# Patient Record
(demographics unavailable — no encounter records)

---

## 2025-05-08 NOTE — ASSESSMENT
[FreeTextEntry1] :  69-year-old *** man with PMH of ED, BPH, hx of microhematuria (first noiced in 3/2021) who presents for biopsy evaluation. Patient reports urinary frequency, occ urgency, nocturia x 2-3, moderate urinary stream, occ trouble emptying his bladder.  Denies family hx of  cancers.   RDR with Dr. Pritchett on 5/19/25 TP prostate biopsy scheduled for 6/26/25  He understands that many men with prostate cancer will die with the disease rather than of it and we also discussed the results large multi-center American and  prostate cancer screening trials. He also understands that PSA in and of itself does not diagnose prostate cancer but only assesses risk to a certain degree. The patient understands that to definitively screen for prostate cancer, a biopsy is required and this procedure has risks, including bleeding, infection, ED and urinary retention. The patient opted to move forward with the biopsy.   The patient is aware to expect hematuria x 2 weeks and upto 4 weeks of hematospermia.  There is a risk of infection albeit much lower than a transrectal approach. Any fever/chills after the biopsy the patient is to contact the office and go to the ER for an immediate evaluation. He has been given paper instructions outlining these items - which includes medications to avoid prior to surgery.   1. CBC, BMP, PSA, UA UCx 2. Medical/Cardiac Clearance 3. TP biopsy at [facility] 4. follow up 2 weeks after biopsy with Dr. Cary 5. we will call with the path results once they are resulted.    Thank you very much for allowing me to assist in the care of this patient. Please do not hesitate to contact me with any additional questions or concerns.

## 2025-05-08 NOTE — HISTORY OF PRESENT ILLNESS
[FreeTextEntry1] :  Dear Dr. Cary,   Thank you so much for the referral to help care for your patient.  Chief Complaint:   elevated psa  Date of first visit: 4/10/25  Osorio Glaser is a 69-year-old *** man with PMH of ED, BPH, hx of microhematuria (first noiced in 3/2021) who presents for biopsy evaluation. Patient reports urinary frequency, occ urgency, nocturia x 2-3, moderate urinary stream, occ trouble emptying his bladder.  Denies family hx of  cancers.   Biopsy by Dr. Cary 4/23/24  benign prostatic glands, basal cell hyperplasia, benign stromal tissues  MRI at [facility] on 3/28/25 28cc prostate, PIRADS 4 lesion in left apex PZ anterior, measuring 1.2 x 0.7 x 0.9cm, PIRADS 3 lesion in right midgland TZ anterior, measuring 0.9 x 0.8 x 0.3cm, No EPE. No LAD and no bony lesions. The images have been reviewed and clinical implications discussed with the patient.  MRI at [facility] on 4/9/24 36 cc prostate, PIRADS 4 lesion in left anterior (TZa) midgland, TZ, measuring 9 x 11 x 9mm, abuts capsule, PIRADS 3 lesion in right, anterior (TZa), base to midgland, TZ, measuring 9 x 8 x 6mm, abuts capsule, No EPE. No LAD and no bony lesions. The images have been reviewed and clinical implications discussed with the patient.  PMHx:    SxHx: none  FHx:    SocHx:  never smoker   Allergies:    PSA Hx:   date? *** 7.55 ng/mL  7/30/24 5.02 ng/mL   4/10/25 IPSS QOL NEIL   The patient denies fevers, chills, nausea and or vomiting and no unexplained weight loss.  All pertinent laboratory, films and physician notes were reviewed. Questionnaire results were discussed with patient.

## 2025-05-28 NOTE — ADDENDUM
[FreeTextEntry1] : 69-year-old man with PMH of HTN, HLD, ED, hx of alejandra hematuria in 3/2021, hx of recurrents UTIs, who presents for elevated psa  Patient reports urinary frequency, occ urgency. Nocturia x 2-3. He had moderate stream. No hx of renal stones.    Thank you very much for allowing me to assist in the care of this patient. Please do not hesitate to contact me with any additional questions or concerns.

## 2025-05-28 NOTE — HISTORY OF PRESENT ILLNESS
[FreeTextEntry1] : Dear Dr. Cary   Thank you so much for the referral to help care for your patient.  Chief Complaint: MRI Review   Date of first visit: 05/19/2025  JOSEFINA RIVERA  is a 69 year old man who is a RDR patient that presents for an MRI review.   PSA Hx 5.01 07/30/2024   MRI Hx MRI at [facility] on 03/28/2025.  28 cc prostate with PIRADS 4 lesion measuring 1.2 x 0.7 x 0.9 cm in the left apex peripheral zone anterior. Lesion #2 PIRADS 3 measuring 0.9 x 0.8 x 0.3 cm in the right mid gland transition zone anterior. No LAD No EPE, No Bony Lesions.  The images have been reviewed and clinical implications discussed with the patient.  MRI at [facility] on 04/04/2024. 36 cc prostate with PIRADS 4 lesion #1 measuring 9 x 11 x 9 mm in the left, anterior (TZa), mid gland, transition zone. Lesion #2 measuring 9 x 8 x 6 mm in the right, anterior (TZa), base-to-mid gland, transition zone. No LAD No EPE, No Bony Lesions.  The images have been reviewed and clinical implications discussed with the patient.  Biopsy Hx 04/23/2024 with Dr. Cary - Benign    05/19/2025 IPSS  QOL  NEIL  EPIC 26    The patient denies fevers, chills, nausea and or vomiting and no unexplained weight loss.  All pertinent laboratory, films and physician notes were reviewed.  Questionnaire results were discussed with patient.

## 2025-05-28 NOTE — HISTORY OF PRESENT ILLNESS
[FreeTextEntry1] : Dear Dr. Cary   Thank you so much for the referral to help care for your patient.  Chief Complaint: MRI Review   Date of first visit: 05/19/2025  JOSEFINA RIVERA  is a 69 year old man who is a RDR patient that presents for an MRI review.   PSA Hx 5.01 07/30/2024   MRI Hx MRI at [facility] on 03/28/2025.  28 cc prostate with PIRADS 4 lesion measuring 1.2 x 0.7 x 0.9 cm in the left apex peripheral zone anterior. Lesion #2 PIRADS 3 measuring 0.9 x 0.8 x 0.3 cm in the right mid gland transition zone anterior. No LAD No EPE, No Bony Lesions.  The images have been reviewed and clinical implications discussed with the patient.  MRI at [facility] on 04/04/2024. 36 cc prostate with PIRADS 4 lesion #1 measuring 9 x 11 x 9 mm in the left, anterior (TZa), mid gland, transition zone. Lesion #2 measuring 9 x 8 x 6 mm in the right, anterior (TZa), base-to-mid gland, transition zone. No LAD No EPE, No Bony Lesions.  The images have been reviewed and clinical implications discussed with the patient.  Biopsy Hx 04/23/2024 with Dr. Cary - Benign    05/19/2025 IPSS  QOL  NEIL  EPIC 26    The patient denies fevers, chills, nausea and or vomiting and no unexplained weight loss.  All pertinent laboratory, films and physician notes were reviewed.  Questionnaire results were discussed with patient.

## 2025-05-28 NOTE — ASSESSMENT
[FreeTextEntry1] : 69-year-old man with PMH of HTN, HLD, afib and is eliquis, right ankle blood clot, ED, hx of alejandra hematuria in 3/2021, hx of recurrents UTIs, who presents for elevated psa. Denies any bothersome urinary symptoms. He has moderate stream. No hx of renal stones.  Schedule RDR with Dr. Pritchett, as well as TP prostate biopsy   IRB Notification  The patient has been made aware of the opportunity to participate in our MR US fusion guided biopsy trial testing the next generation biopsy technology. This is an IRB approved trial (IRB #). He is already being consented for a standard MR US fusion guided biopsy therefore there is no change in his clinical work flow. He has been given a copy of the consent to review before the biopsy date and given an opportunity to contact us with any further questions. He will be consented on the day of the procedure or electronically before the biopsy.  He understands that many men with prostate cancer will die with the disease rather than of it and we also discussed the results large multi-center American and  prostate cancer screening trials. He also understands that PSA in and of itself does not diagnose prostate cancer but only assesses risk to a certain degree. The patient understands that to definitively screen for prostate cancer, a biopsy is required, and this procedure has risks, including bleeding, infection, ED and urinary retention. The patient opted to move forward with the biopsy.  The patient is aware to expect hematuria x 2 weeks and up to 4 weeks of hematospermia. There is a risk of infection albeit much lower than a transrectal approach. Any fever/chills after the biopsy the patient is to contact the office and go to the ER for an immediate evaluation. He has been given paper instructions outlining these items - which includes medications to avoid prior to surgery.  1. CBC, BMP, UA/UCx, PSA today 5/28/25 2. Cardiac clearance, EKG and CXR  3. TP biopsy at [facility] 4. follow up 2 weeks after biopsy with Dr. Cary  5. we will call with the path results once they are resulted.  Thank you very much for allowing me to assist in the care of this patient. Please do not hesitate to contact me with any additional questions or concerns.

## 2025-05-28 NOTE — ASSESSMENT
[FreeTextEntry1] : 69-year-old man with PMH of HTN, HLD, afib and is eliquis, right ankle blood clot, ED, hx of alejandra hematuria in 3/2021, hx of recurrents UTIs, who presents for elevated psa. Denies any bothersome urinary symptoms. He has moderate stream. No hx of renal stones.  Schedule RDR with Dr. Pritchett, as well as TP prostate biopsy   IRB Notification  The patient has been made aware of the opportunity to participate in our MR US fusion guided biopsy trial testing the next generation biopsy technology. This is an IRB approved trial (IRB #). He is already being consented for a standard MR US fusion guided biopsy therefore there is no change in his clinical work flow. He has been given a copy of the consent to review before the biopsy date and given an opportunity to contact us with any further questions. He will be consented on the day of the procedure or electronically before the biopsy.  He understands that many men with prostate cancer will die with the disease rather than of it and we also discussed the results large multi-center American and  prostate cancer screening trials. He also understands that PSA in and of itself does not diagnose prostate cancer but only assesses risk to a certain degree. The patient understands that to definitively screen for prostate cancer, a biopsy is required, and this procedure has risks, including bleeding, infection, ED and urinary retention. The patient opted to move forward with the biopsy.  The patient is aware to expect hematuria x 2 weeks and up to 4 weeks of hematospermia. There is a risk of infection albeit much lower than a transrectal approach. Any fever/chills after the biopsy the patient is to contact the office and go to the ER for an immediate evaluation. He has been given paper instructions outlining these items - which includes medications to avoid prior to surgery.  1. CBC, BMP, UA/UCx, PSA today 5/28/25 2. Cardiac clearance, EKG and CXR  3. TP biopsy at [facility] 4. follow up 2 weeks after biopsy with Dr. Cary  5. we will call with the path results once they are resulted.  Thank you very much for allowing me to assist in the care of this patient. Please do not hesitate to contact me with any additional questions or concerns.

## 2025-05-28 NOTE — PHYSICAL EXAM
[General Appearance - Well Nourished] : well nourished [General Appearance - In No Acute Distress] : no acute distress [] : no respiratory distress [Normal Station and Gait] : the gait and station were normal for the patient's age [Oriented To Time, Place, And Person] : oriented to person, place, and time

## 2025-05-28 NOTE — HISTORY OF PRESENT ILLNESS
[FreeTextEntry1] : Dear Dr. Cary,  Thank you so much for the referral to help care for your patient.  Chief Complaint: MRI Review Date of first visit: 05/19/2025  JOSEFINA RIVERA is a 69-year-old man with PMH of HTN, HLD, ED, colitis, afib and is on Eliquis, right ankle blood clot (being followed by cardiologist), hx of alejandra hematuria in 3/2021, hx of recurrent UTIs, who presents for elevated psa . Denies any bothersome urinary symptoms. He has moderate stream. No hx of renal stones.   Unknown Family hx: adopted   PSA Hx 7.08 3/10/25, PSAD 0.25 ng/ml/cc  6.10 10/30/2024 5.01 07/30/2024, PSAD 0.17 ng/ml/cc  4.0 12/14/23  3.8 8/31/23  3.9 6/15/23 2.8 1/2022 3.0 7/13/21 2.9 3/2021   MRI Hx MRI at [facility] on 03/28/2025. 28 cc prostate with PIRADS 4 lesion measuring 1.2 x 0.7 x 0.9 cm in the left apex peripheral zone anterior. Lesion #2 PIRADS 3 measuring 0.9 x 0.8 x 0.3 cm in the right mid gland transition zone anterior. No LAD No EPE, No Bony Lesions. The images have been reviewed and clinical implications discussed with the patient.  MRI at [facility] on 04/04/2024. 36 cc prostate with PIRADS 4 lesion #1 measuring 9 x 11 x 9 mm in the left, anterior (TZa), mid gland, transition zone. Lesion #2 measuring 9 x 8 x 6 mm in the right, anterior (TZa), base-to-mid gland, transition zone. No LAD No EPE, No Bony Lesions. The images have been reviewed and clinical implications discussed with the patient.  Biopsy Hx 04/23/2024 with Dr. Cary - Benign  Social Hx:  never smoker  alcohol socially, used to drink 2 shots with meals uses marijuana   05/19/2025 IPSS 1 QOL 2-3 NEIL- not sexually active    Prostate cancer screening: the patient and I spoke at length about prostate cancer screening, its risks and its benefits. The patient has 2 (age, PSA) risk factors for prostate cancer. He understands that many men with prostate cancer will die with the disease rather than of it and we also discussed the results large multi-center American and  prostate cancer screening trials. He also understands that PSA in and of itself does not diagnose prostate cancer but only assesses risk to a certain degree. The patient understands that to definitively screen for prostate cancer, a biopsy is required, and this procedure has risks, including bleeding, infection, ED and urinary retention. The patient opted to fusion biopsy.  The patient denies fevers, chills, nausea and or vomiting and no unexplained weight loss.  All pertinent laboratory, films and physician notes were reviewed. Questionnaire results were discussed with patient.

## 2025-05-28 NOTE — HISTORY OF PRESENT ILLNESS
[FreeTextEntry1] : Dear Dr. Cary,  Thank you so much for the referral to help care for your patient.  Chief Complaint: MRI Review Date of first visit: 05/19/2025  JOSEFINA RIVERA is a 69-year-old man with PMH of HTN, HLD, ED, colitis, afib and is on Eliquis, right ankle blood clot (being followed by cardiologist), hx of alejandra hematuria in 3/2021, hx of recurrent UTIs, who presents for elevated psa . Denies any bothersome urinary symptoms. He has moderate stream. No hx of renal stones.   Unknown Family hx: adopted   PSA Hx 7.08 3/10/25, PSAD 0.25 ng/ml/cc  6.10 10/30/2024 5.01 07/30/2024, PSAD 0.17 ng/ml/cc  4.0 12/14/23  3.8 8/31/23  3.9 6/15/23 2.8 1/2022 3.0 7/13/21 2.9 3/2021   MRI Hx MRI at [facility] on 03/28/2025. 28 cc prostate with PIRADS 4 lesion measuring 1.2 x 0.7 x 0.9 cm in the left apex peripheral zone anterior. Lesion #2 PIRADS 3 measuring 0.9 x 0.8 x 0.3 cm in the right mid gland transition zone anterior. No LAD No EPE, No Bony Lesions. The images have been reviewed and clinical implications discussed with the patient.  MRI at [facility] on 04/04/2024. 36 cc prostate with PIRADS 4 lesion #1 measuring 9 x 11 x 9 mm in the left, anterior (TZa), mid gland, transition zone. Lesion #2 measuring 9 x 8 x 6 mm in the right, anterior (TZa), base-to-mid gland, transition zone. No LAD No EPE, No Bony Lesions. The images have been reviewed and clinical implications discussed with the patient.  Biopsy Hx 04/23/2024 with Dr. Cary - Benign  Social Hx:  never smoker  alcohol socially, used to drink 2 shots with meals uses marijuana   05/19/2025 IPSS 1 QOL 2-3 NEIL- not sexually active    Prostate cancer screening: the patient and I spoke at length about prostate cancer screening, its risks and its benefits. The patient has 2 (age, PSA) risk factors for prostate cancer. He understands that many men with prostate cancer will die with the disease rather than of it and we also discussed the results large multi-center American and  prostate cancer screening trials. He also understands that PSA in and of itself does not diagnose prostate cancer but only assesses risk to a certain degree. The patient understands that to definitively screen for prostate cancer, a biopsy is required, and this procedure has risks, including bleeding, infection, ED and urinary retention. The patient opted to fusion biopsy.  The patient denies fevers, chills, nausea and or vomiting and no unexplained weight loss.  All pertinent laboratory, films and physician notes were reviewed. Questionnaire results were discussed with patient.

## 2025-06-09 NOTE — HISTORY OF PRESENT ILLNESS
[FreeTextEntry1] : Dear Dr. Cary,  Thank you so much for the referral to help care for your patient.  Chief Complaint: MRI Review Date of first visit: 05/19/2025  JOSEFINA RIVERA is a 69-year-old man with PMH of HTN, HLD, ED, colitis, afib and is on Eliquis, right ankle blood clot (being followed by cardiologist), hx of alejandra hematuria in 3/2021, hx of recurrent UTIs, who presents for elevated psa . Denies any bothersome urinary symptoms. He has moderate stream. No hx of renal stones.  Unknown Family hx: adopted  PSA Hx 5.83 05/28/2025 7.08 3/10/25, PSAD 0.25 ng/ml/cc 6.10 10/30/2024 5.01 07/30/2024, PSAD 0.17 ng/ml/cc 4.0 12/14/23 3.8 8/31/23 3.9 6/15/23 2.8 1/2022 3.0 7/13/21 2.9 3/2021  MRI Hx MRI at [facility] on 03/28/2025. 28 cc prostate with PIRADS 4 lesion measuring 1.2 x 0.7 x 0.9 cm in the left apex peripheral zone anterior. Lesion #2 PIRADS 3 measuring 0.9 x 0.8 x 0.3 cm in the right mid gland transition zone anterior. No LAD No EPE, No Bony Lesions. The images have been reviewed and clinical implications discussed with the patient.  MRI at [facility] on 04/04/2024. 36 cc prostate with PIRADS 4 lesion #1 measuring 9 x 11 x 9 mm in the left, anterior (TZa), mid gland, transition zone. Lesion #2 measuring 9 x 8 x 6 mm in the right, anterior (TZa), base-to-mid gland, transition zone. No LAD No EPE, No Bony Lesions. The images have been reviewed and clinical implications discussed with the patient.  Biopsy Hx 04/23/2024 with Dr. Cary - Benign  Social Hx: never smoker alcohol socially, used to drink 2 shots with meals uses marijuana  06/16/2025 IPSS  QOL  NEIL   05/19/2025 IPSS 1 QOL 2-3 NEIL- not sexually active  Prostate cancer screening: the patient and I spoke at length about prostate cancer screening, its risks and its benefits. The patient has 2 (age, PSA) risk factors for prostate cancer. He understands that many men with prostate cancer will die with the disease rather than of it and we also discussed the results large multi-center American and  prostate cancer screening trials. He also understands that PSA in and of itself does not diagnose prostate cancer but only assesses risk to a certain degree. The patient understands that to definitively screen for prostate cancer, a biopsy is required, and this procedure has risks, including bleeding, infection, ED and urinary retention. The patient opted to fusion biopsy.  The patient denies fevers, chills, nausea and or vomiting and no unexplained weight loss.  All pertinent laboratory, films and physician notes were reviewed. Questionnaire results were discussed with patient.

## 2025-06-09 NOTE — ASSESSMENT
[FreeTextEntry1] : 69-year-old man with PMH of HTN, HLD, afib and is eliquis, right ankle blood clot, ED, hx of alejandra hematuria in 3/2021, hx of recurrents UTIs, who presents for elevated psa. Denies any bothersome urinary symptoms. He has moderate stream. No hx of renal stones.  Schedule RDR with Dr. Pritchett, as well as TP prostate biopsy   IRB Notification  The patient has been made aware of the opportunity to participate in our MR US fusion guided biopsy trial testing the next generation biopsy technology. This is an IRB approved trial (IRB #). He is already being consented for a standard MR US fusion guided biopsy therefore there is no change in his clinical work flow. He has been given a copy of the consent to review before the biopsy date and given an opportunity to contact us with any further questions. He will be consented on the day of the procedure or electronically before the biopsy.  He understands that many men with prostate cancer will die with the disease rather than of it and we also discussed the results large multi-center American and  prostate cancer screening trials. He also understands that PSA in and of itself does not diagnose prostate cancer but only assesses risk to a certain degree. The patient understands that to definitively screen for prostate cancer, a biopsy is required, and this procedure has risks, including bleeding, infection, ED and urinary retention. The patient opted to move forward with the biopsy.  The patient is aware to expect hematuria x 2 weeks and up to 4 weeks of hematospermia. There is a risk of infection albeit much lower than a transrectal approach. Any fever/chills after the biopsy the patient is to contact the office and go to the ER for an immediate evaluation. He has been given paper instructions outlining these items - which includes medications to avoid prior to surgery.  1. CBC, BMP, UA/UCx, PSA today 5/28/25 2. Cardiac clearance, EKG and CXR 3. TP biopsy at [facility] 4. follow up 2 weeks after biopsy with Dr. Cary 5. we will call with the path results once they are resulted.  Thank you very much for allowing me to assist in the care of this patient. Please do not hesitate to contact me with any additional questions or concerns.

## 2025-06-09 NOTE — HISTORY OF PRESENT ILLNESS
[FreeTextEntry1] : Dear Dr. Cary,  Thank you so much for the referral to help care for your patient.  Chief Complaint: MRI Review Date of first visit: 05/19/2025  JOSEFINA RIVERA is a 69-year-old man with PMH of HTN, HLD, ED, colitis, afib and is on Eliquis, right ankle blood clot (being followed by cardiologist), hx of alejandra hematuria in 3/2021, hx of recurrent UTIs, who presents for elevated psa . Denies any bothersome urinary symptoms. He has moderate stream. No hx of renal stones.  Unknown Family hx: adopted  PSA Hx 5.83 05/28/2025 7.08 3/10/25, PSAD 0.25 ng/ml/cc 6.10 10/30/2024 5.01 07/30/2024, PSAD 0.17 ng/ml/cc 4.0 12/14/23 3.8 8/31/23 3.9 6/15/23 2.8 1/2022 3.0 7/13/21 2.9 3/2021  MRI Hx MRI at [facility] on 03/28/2025. 28 cc prostate with PIRADS 4 lesion measuring 1.2 x 0.7 x 0.9 cm in the left apex peripheral zone anterior. Lesion #2 PIRADS 3 measuring 0.9 x 0.8 x 0.3 cm in the right mid gland transition zone anterior. No LAD No EPE, No Bony Lesions. The images have been reviewed and clinical implications discussed with the patient.  MRI at [facility] on 04/04/2024. 36 cc prostate with PIRADS 4 lesion #1 measuring 9 x 11 x 9 mm in the left, anterior (TZa), mid gland, transition zone. Lesion #2 measuring 9 x 8 x 6 mm in the right, anterior (TZa), base-to-mid gland, transition zone. No LAD No EPE, No Bony Lesions. The images have been reviewed and clinical implications discussed with the patient.  Biopsy Hx 04/23/2024 with Dr. Cary - Benign  Social Hx: never smoker alcohol socially, used to drink 2 shots with meals uses marijuana  06/16/2025 IPSS  QOL  NEIL   05/19/2025 IPSS 1 QOL 2-3 NEIL- not sexually active  Prostate cancer screening: the patient and I spoke at length about prostate cancer screening, its risks and its benefits. The patient has 2 (age, PSA) risk factors for prostate cancer. He understands that many men with prostate cancer will die with the disease rather than of it and we also discussed the results large multi-center [place] and  prostate cancer screening trials. He also understands that PSA in and of itself does not diagnose prostate cancer but only assesses risk to a certain degree. The patient understands that to definitively screen for prostate cancer, a biopsy is required, and this procedure has risks, including bleeding, infection, ED and urinary retention. The patient opted to fusion biopsy.  The patient denies fevers, chills, nausea and or vomiting and no unexplained weight loss.  All pertinent laboratory, films and physician notes were reviewed. Questionnaire results were discussed with patient.